# Patient Record
Sex: MALE | Race: WHITE | Employment: UNEMPLOYED | URBAN - METROPOLITAN AREA
[De-identification: names, ages, dates, MRNs, and addresses within clinical notes are randomized per-mention and may not be internally consistent; named-entity substitution may affect disease eponyms.]

---

## 2019-06-09 ENCOUNTER — HOSPITAL ENCOUNTER (EMERGENCY)
Age: 39
Discharge: HOME | End: 2019-06-09
Attending: EMERGENCY MEDICINE
Payer: COMMERCIAL

## 2019-06-09 ENCOUNTER — APPOINTMENT (OUTPATIENT)
Dept: CT IMAGING | Age: 39
End: 2019-06-09
Payer: COMMERCIAL

## 2019-06-09 VITALS
OXYGEN SATURATION: 97 % | DIASTOLIC BLOOD PRESSURE: 80 MMHG | TEMPERATURE: 97.6 F | HEART RATE: 117 BPM | RESPIRATION RATE: 16 BRPM | SYSTOLIC BLOOD PRESSURE: 147 MMHG

## 2019-06-09 DIAGNOSIS — F10.929 ACUTE ALCOHOLIC INTOXICATION WITH COMPLICATION (HCC): Primary | ICD-10-CM

## 2019-06-09 LAB
AMPHETAMINE SCREEN, URINE: NOT DETECTED
ANION GAP SERPL CALCULATED.3IONS-SCNC: 14 MMOL/L (ref 7–16)
BARBITURATE SCREEN URINE: NOT DETECTED
BENZODIAZEPINE SCREEN, URINE: NOT DETECTED
BUN BLDV-MCNC: 6 MG/DL (ref 6–20)
CALCIUM SERPL-MCNC: 8.4 MG/DL (ref 8.6–10.2)
CANNABINOID SCREEN URINE: NOT DETECTED
CHLORIDE BLD-SCNC: 107 MMOL/L (ref 98–107)
CO2: 22 MMOL/L (ref 22–29)
COCAINE METABOLITE SCREEN URINE: NOT DETECTED
CREAT SERPL-MCNC: 0.8 MG/DL (ref 0.7–1.2)
ETHANOL: 470 MG/DL (ref 0–0.08)
GFR AFRICAN AMERICAN: >60
GFR NON-AFRICAN AMERICAN: >60 ML/MIN/1.73
GLUCOSE BLD-MCNC: 81 MG/DL (ref 74–99)
MAGNESIUM: 2.1 MG/DL (ref 1.6–2.6)
METHADONE SCREEN, URINE: NOT DETECTED
OPIATE SCREEN URINE: NOT DETECTED
PHENCYCLIDINE SCREEN URINE: NOT DETECTED
POTASSIUM REFLEX MAGNESIUM: 3.8 MMOL/L (ref 3.5–5)
PROPOXYPHENE SCREEN: NOT DETECTED
SODIUM BLD-SCNC: 143 MMOL/L (ref 132–146)

## 2019-06-09 PROCEDURE — 70450 CT HEAD/BRAIN W/O DYE: CPT

## 2019-06-09 PROCEDURE — 80048 BASIC METABOLIC PNL TOTAL CA: CPT

## 2019-06-09 PROCEDURE — 99285 EMERGENCY DEPT VISIT HI MDM: CPT

## 2019-06-09 PROCEDURE — G0480 DRUG TEST DEF 1-7 CLASSES: HCPCS

## 2019-06-09 PROCEDURE — 80307 DRUG TEST PRSMV CHEM ANLYZR: CPT

## 2019-06-09 PROCEDURE — 2580000003 HC RX 258: Performed by: EMERGENCY MEDICINE

## 2019-06-09 PROCEDURE — 83735 ASSAY OF MAGNESIUM: CPT

## 2019-06-09 RX ORDER — 0.9 % SODIUM CHLORIDE 0.9 %
2000 INTRAVENOUS SOLUTION INTRAVENOUS ONCE
Status: COMPLETED | OUTPATIENT
Start: 2019-06-09 | End: 2019-06-09

## 2019-06-09 RX ADMIN — SODIUM CHLORIDE 2000 ML: 9 INJECTION, SOLUTION INTRAVENOUS at 08:27

## 2019-06-09 NOTE — ED NOTES
Patient talking  and asymptomatic and denies any needs at this time     Nancie Rubio, RN  06/09/19 2722 Komal Jessica RN  06/09/19 4747

## 2019-06-09 NOTE — ED NOTES
Pt awake, Standing at side of bed naked. Pt not answering questions. Assisted back to bed.      Yue Mortensen RN  06/09/19 0521

## 2019-06-09 NOTE — CARE COORDINATION
6/9/2019 Social Work Discharge Planning:  SW was asked td come and see Pt. Pt had arrived by squad after passing out from drinking at a bar. Nurse wasn't sure what Pts needs may be, but that he is living in a motel. SW brought resources Pt may be interested, including homeless shelters, Peer Recovery Support,  AA group meetings and addiction recovery facilities. Pt woud not speak to  and declined any info. available. Sw told Pt if he changes his mind to let the nurse know so she can call me. Pt said he is within walking distance from  his hotel. Electronically signed by LIBBY Gonzalez on 6/9/2019 at 11:59 AM

## 2019-06-09 NOTE — ED NOTES
Boxed tray ordered for patient and advised patient of need to find a ride when discharged     Chiquis Elkins RN  06/09/19 9080

## 2019-06-09 NOTE — ED NOTES
Pt more alert and answering questions attempted to find patient transport home pt refuses to give nursing staff any contact information at this time. States he living at a hotel in [facility] and will not provide any other information. He Asked why he was here. Told him he was intoxicated and not responsive enough to be discharged at this time unless some one could come to pick him up. Social work notified.       Gurwinder Atkinson RN  06/09/19 3683

## 2019-06-09 NOTE — ED PROVIDER NOTES
HPI:  6/9/19,   Time: 1:54 AM         Cortney Braun is a 42 y.o. male presenting to the ED for alcohol intoxication/decreased responsiveness, beginning prior to arrival and brought in by squad from the bar where he apparently passed out ago. The complaint has been constant, moderate in severity, and worsened by nothing. No relief with Narcan. ROS:   Pertinent positives and negatives are stated within HPI, all other systems reviewed and are negative.  --------------------------------------------- PAST HISTORY ---------------------------------------------  Past Medical History:  has no past medical history on file. Past Surgical History:  has no past surgical history on file. Social History:      Family History: family history is not on file. The patients home medications have been reviewed.     Allergies: Patient has no allergy information on record.    -------------------------------------------------- RESULTS -------------------------------------------------  All laboratory and radiology results have been personally reviewed by myself   LABS:  Results for orders placed or performed during the hospital encounter of 06/09/19   Ethanol   Result Value Ref Range    Ethanol Lvl 470 (HH) mg/dL   Urine Drug Screen   Result Value Ref Range    Amphetamine Screen, Urine NOT DETECTED Negative <1000 ng/mL    Barbiturate Screen, Ur NOT DETECTED Negative < 200 ng/mL    Benzodiazepine Screen, Urine NOT DETECTED Negative < 200 ng/mL    Cannabinoid Scrn, Ur NOT DETECTED Negative < 50ng/mL    Cocaine Metabolite Screen, Urine NOT DETECTED Negative < 300 ng/mL    Opiate Scrn, Ur NOT DETECTED Negative < 300ng/mL    PCP Screen, Urine NOT DETECTED Negative < 25 ng/mL    Methadone Screen, Urine NOT DETECTED Negative <300 ng/mL    Propoxyphene Scrn, Ur NOT DETECTED Negative <300 ng/mL   Basic Metabolic Panel w/ Reflex to MG   Result Value Ref Range    Sodium 143 132 - 146 mmol/L    Potassium reflex Magnesium 3.8 3.5 - 5.0 mmol/L    Chloride 107 98 - 107 mmol/L    CO2 22 22 - 29 mmol/L    Anion Gap 14 7 - 16 mmol/L    Glucose 81 74 - 99 mg/dL    BUN 6 6 - 20 mg/dL    CREATININE 0.8 0.7 - 1.2 mg/dL    GFR Non-African American >60 >=60 mL/min/1.73    GFR African American >60     Calcium 8.4 (L) 8.6 - 10.2 mg/dL   Magnesium   Result Value Ref Range    Magnesium 2.1 1.6 - 2.6 mg/dL       RADIOLOGY:  Interpreted by Radiologist.  CT Head WO Contrast   Final Result   No acute intracranial process/injury. No fracture. Area of mild parietal scalp    swelling. This report has been electronically signed by Amy Altamirano MD.          ------------------------- NURSING NOTES AND VITALS REVIEWED ---------------------------   The nursing notes within the ED encounter and vital signs as below have been reviewed. /71   Pulse 76   Temp 97.6 °F (36.4 °C)   Resp 18   SpO2 100%   Oxygen Saturation Interpretation:  Abnormal 86 percent      ---------------------------------------------------PHYSICAL EXAM--------------------------------------      Constitutional/General: Alert and responds only to painful stimuli/nasal trumpet applied  Head: NC/AT  Eyes: PERRL, EOMI  Mouth: Oropharynx clear, handling secretions, no trismus  Neck: Supple, full ROM, no meningeal signs  Pulmonary: Lungs clear to auscultation bilaterally, no wheezes, rales, or rhonchi. Not in respiratory distress  Cardiovascular:  Regular rate and rhythm, no murmurs, gallops, or rubs. 2+ distal pulses  Abdomen: Soft, non tender, non distended,   Extremities: Moves x 4. Warm and well perfused  Skin: warm and dry without rash  Neurologic: GCS 15, moving all extremities without focal deficits  Psych: Normal Affect      ------------------------------ ED COURSE/MEDICAL DECISION MAKING----------------------  Medications - No data to display      Medical Decision Making:    Alcohol intoxication with altered sensorium    Counseling:    The emergency provider has spoken with the patient and discussed todays results, in addition to providing specific details for the plan of care and counseling regarding the diagnosis and prognosis. Questions are answered at this time and they are agreeable with the plan.      --------------------------------- IMPRESSION AND DISPOSITION ---------------------------------    IMPRESSION  1.  Acute alcoholic intoxication with complication [facility]) Improving       DISPOSITION  Disposition: Discharge to home  Patient condition is stable      ED course: Patient is beginning to speak-waking up therefore will be discharged when he has a ride            Danie Dominguez MD  06/09/19 6643